# Patient Record
(demographics unavailable — no encounter records)

---

## 2024-11-20 NOTE — HISTORY OF PRESENT ILLNESS
[FreeTextEntry1] : 79yo female recent sonogram with hepatic cysts and focal adenomyomatosis of GB. Hepatic profile is normal. NO weight loss, anorexia. NO ruq pain. NO n/v. No sob. Pt states UTD with colonoscopy and endoscopy with Dr. Stephenson. Pt did not present any records. States has hx of colon polyps. Pt states has hx of colitis and was treated with mesalamine. Pt complains of constipation.Uses Dulocolax prn for constipation. [de-identified] : 09/2024 liver cysts 2 cm and 3 cm  Focal adenomyomatoris of gb. No gallsotnes

## 2024-11-20 NOTE — ASSESSMENT
[FreeTextEntry1] : 77yo female recent sonogram with hepatic cysts and focal adenomyomatosis of GB. Hepatic profile is normal. NO weight loss, anorexia. NO ruq pain. NO n/v. No sob.  IMP; liver cysts, no concerns adenomyomatosis of gb reported hx of colonic polyps PLAN: reassured pt Prn followup advised pt if she wishes to followup with me she is required to forward records to me for review.

## 2024-11-20 NOTE — PHYSICAL EXAM

## 2024-11-20 NOTE — PHYSICAL EXAM
[Alert] : alert [Normal Voice/Communication] : normal voice/communication [Healthy Appearing] : healthy appearing [No Acute Distress] : no acute distress [Sclera] : the sclera and conjunctiva were normal [Hearing Threshold Finger Rub Not [place]] : hearing was normal [Normal Lips/Gums] : the lips and gums were normal [Oropharynx] : the oropharynx was normal [Normal Appearance] : the appearance of the neck was normal [No Neck Mass] : no neck mass was observed [No Respiratory Distress] : no respiratory distress [No Acc Muscle Use] : no accessory muscle use [Respiration, Rhythm And Depth] : normal respiratory rhythm and effort [Auscultation Breath Sounds / Voice Sounds] : lungs were clear to auscultation bilaterally [Heart Rate And Rhythm] : heart rate was normal and rhythm regular [Normal S1, S2] : normal S1 and S2 [Murmurs] : no murmurs [Bowel Sounds] : normal bowel sounds [Abdomen Tenderness] : non-tender [No Masses] : no abdominal mass palpated [Abdomen Soft] : soft [] : no hepatosplenomegaly [Oriented To Time, Place, And Person] : oriented to person, place, and time

## 2024-11-20 NOTE — ASSESSMENT
[FreeTextEntry1] : 79yo female recent sonogram with hepatic cysts and focal adenomyomatosis of GB. Hepatic profile is normal. NO weight loss, anorexia. NO ruq pain. NO n/v. No sob.  IMP; liver cysts, no concerns adenomyomatosis of gb reported hx of colonic polyps PLAN: reassured pt Prn followup advised pt if she wishes to followup with me she is required to forward records to me for review.

## 2024-11-20 NOTE — HISTORY OF PRESENT ILLNESS
[FreeTextEntry1] : 77yo female recent sonogram with hepatic cysts and focal adenomyomatosis of GB. Hepatic profile is normal. NO weight loss, anorexia. NO ruq pain. NO n/v. No sob. Pt states UTD with colonoscopy and endoscopy with Dr. Stephenson. Pt did not present any records. States has hx of colon polyps. Pt states has hx of colitis and was treated with mesalamine. Pt complains of constipation.Uses Dulocolax prn for constipation. [de-identified] : 09/2024 liver cysts 2 cm and 3 cm  Focal adenomyomatoris of gb. No gallsotnes

## 2025-02-05 NOTE — HISTORY OF PRESENT ILLNESS
[FreeTextEntry1] : This is a 79 y/o female with a pmhx of DM2, HTN, UC here today to for follow up.  Patient with constipation ,takes Dulcolax as needed  Patient is requesting a new endocrinologist since her current endocrinologist moved Patient c/o burning sensation around her left knee Patient denies chest pain, dyspnea, palpitations, dizziness, syncope, changes in bowel/bladder habits or appetite